# Patient Record
Sex: FEMALE | Race: WHITE | NOT HISPANIC OR LATINO | Employment: OTHER | URBAN - METROPOLITAN AREA
[De-identification: names, ages, dates, MRNs, and addresses within clinical notes are randomized per-mention and may not be internally consistent; named-entity substitution may affect disease eponyms.]

---

## 2022-02-21 ENCOUNTER — TELEPHONE (OUTPATIENT)
Dept: OBSTETRICS AND GYNECOLOGY | Facility: CLINIC | Age: 82
End: 2022-02-21

## 2022-02-21 DIAGNOSIS — Z12.31 SCREENING MAMMOGRAM FOR BREAST CANCER: Primary | ICD-10-CM

## 2022-02-21 NOTE — TELEPHONE ENCOUNTER
----- Message from Cheryl Castro sent at 2/21/2022 10:00 AM CST -----  Regarding: order request  Contact: Pt  Pt is calling to get an order for mammogram. Pt is wanting to get her mammogram in . Please call back at [phone]//thank you acc

## 2022-05-17 ENCOUNTER — TELEPHONE (OUTPATIENT)
Dept: OBSTETRICS AND GYNECOLOGY | Facility: CLINIC | Age: 82
End: 2022-05-17

## 2022-12-13 NOTE — TELEPHONE ENCOUNTER
----- Message from Nora Skip sent at 2022 10:13 AM CST -----  Regarding: New Refill  Contact: patient  Type:  RX Refill Request    Who Called: Rafaela  Refill or New Rx: refill   RX Name and Strength: pantoprazole (PROTONIX) 20 MG tablet  How is the patient currently taking it? (ex. 1XDay):daily   Is this a 30 day or 90 day RX:90  Preferred Pharmacy with phone number:   [facility] [id number] - [place], [place] -  [address]  [place] [address]  Phone: [phone] Fax: [phone]      Local or Mail Order:local   Ordering Provider: Dr Paige  Would the patient rather a call back or a response via [facility]?  Call back   Best Call Back Number: [phone]    Additional Information: The prescription has  and needs a new one      ThanksBRISA

## 2022-12-14 RX ORDER — PANTOPRAZOLE SODIUM 20 MG/1
20 TABLET, DELAYED RELEASE ORAL DAILY
Qty: 90 TABLET | Refills: 1 | Status: SHIPPED | OUTPATIENT
Start: 2022-12-14 | End: 2023-06-19 | Stop reason: SDUPTHER

## 2022-12-14 RX ORDER — PANTOPRAZOLE SODIUM 20 MG/1
20 TABLET, DELAYED RELEASE ORAL DAILY
Qty: 90 TABLET | Refills: 3 | Status: SHIPPED | OUTPATIENT
Start: 2022-12-14 | End: 2022-12-14

## 2022-12-14 RX ORDER — PANTOPRAZOLE SODIUM 20 MG/1
20 TABLET, DELAYED RELEASE ORAL DAILY
Qty: 30 TABLET | Refills: 11 | OUTPATIENT
Start: 2022-12-14 | End: 2023-12-14

## 2023-06-06 ENCOUNTER — TELEPHONE (OUTPATIENT)
Dept: GASTROENTEROLOGY | Facility: CLINIC | Age: 83
End: 2023-06-06
Payer: MEDICARE

## 2023-06-06 NOTE — TELEPHONE ENCOUNTER
----- Message from Anupama Wright sent at 6/6/2023  3:48 PM CDT -----  Patient is calling to change appointment on 7/10/23...Please call her back at [phone]

## 2023-06-14 NOTE — PROGRESS NOTES
Clinic Note    Reason for visit:  The primary encounter diagnosis was Dysphagia, unspecified type. Diagnoses of Esophageal diverticulum, Gastroesophageal reflux disease, unspecified whether esophagitis present, and Family history of colon cancer in mother were also pertinent to this visit.    PCP: Eugene Augustine       HPI:  This is a 83 y.o. female who is established. Last seen 11/2021. She has been having increase in dysphagia with solids since 4/2023. She has to induce vomiting for relief due to dysphagia- mainly with rice, breads, and meats. She has had previous esophageal dilation that helps. She had back surgery 4/2023 she states after intubation is when her dysphagia worsened. On pantoprazole 20mg daily which controls her reflux well. Denies n/v, melena, NSAID use. She has had increase in constipation since her back surgery. Has been taking stool softeners which helps her have daily BM. Deneis rectal bleeding. Her mother had Colon cancer.    2/26/2019 EGD single diverticulum in middle third of esophagus, 14mm Esophageal stricture with dilation, small HH    8/2017 Colonoscopy: normal.     Review of Systems   Constitutional:  Negative for fatigue, fever and unexpected weight change.   HENT:  Positive for trouble swallowing. Negative for mouth sores, postnasal drip and sore throat.    Eyes:  Negative for pain, discharge and eye dryness.   Respiratory:  Negative for apnea, cough, choking, chest tightness, shortness of breath and wheezing.    Cardiovascular:  Negative for chest pain, palpitations and leg swelling.   Gastrointestinal:  Negative for abdominal distention, abdominal pain, anal bleeding, blood in stool, change in bowel habit, constipation, diarrhea, nausea, rectal pain, vomiting, reflux, fecal incontinence and change in bowel habit.   Genitourinary:  Negative for bladder incontinence, dysuria and hematuria.   Musculoskeletal:  Negative for arthralgias, back pain and joint swelling.   Integumentary:   "Negative for color change and rash.   Allergic/Immunologic: Negative for environmental allergies and food allergies.   Neurological:  Negative for seizures and headaches.   Hematological:  Negative for adenopathy. Does not bruise/bleed easily.      Past Medical History:   Diagnosis Date    Essential (primary) hypertension     GERD (gastroesophageal reflux disease)     Mixed hyperlipidemia      Past Surgical History:   Procedure Laterality Date    BACK SURGERY      two rods on either side of spine and 8 screws    COLONOSCOPY       Family History   Problem Relation Age of Onset    Colon cancer Mother      Social History     Tobacco Use    Smoking status: Never    Smokeless tobacco: Never   Substance Use Topics    Alcohol use: Not Currently    Drug use: Never     Review of patient's allergies indicates:   Allergen Reactions    Atorvastatin Other (See Comments)    Simvastatin Other (See Comments)    Allegra-d 12 hour [fexofenadine-pseudoephedrine] Nausea And Vomiting    Azithromycin Rash    Lyrica [pregabalin] Rash      Medication List with Changes/Refills   New Medications    SODIUM,POTASSIUM,MAG SULFATES (SUPREP BOWEL PREP KIT) 17.5-3.13-1.6 GRAM SOLR    Take 177 mLs by mouth once. Take according to kit instructions but DO NOT eat breakfast the morning of procedure. for 1 dose   Current Medications    ASPIRIN (ECOTRIN) 81 MG EC TABLET    Take 81 mg by mouth once daily.    EZETIMIBE (ZETIA) 10 MG TABLET    Take 10 mg by mouth.    GABAPENTIN (NEURONTIN) 300 MG CAPSULE        METOPROLOL SUCCINATE (TOPROL-XL) 50 MG 24 HR TABLET        OLMESARTAN (BENICAR) 20 MG TABLET    Take 20 mg by mouth.    PANTOPRAZOLE (PROTONIX) 20 MG TABLET    Take 1 tablet (20 mg total) by mouth once daily.    TRIAMCINOLONE ACETONIDE 0.1% (KENALOG) 0.1 % CREAM    2 (two) times daily. Apply to affected area         Vital Signs:  BP (!) 166/85   Pulse 80   Ht 5' 2" (1.575 m)   Wt 65.3 kg (144 lb)   SpO2 (!) 94%   BMI 26.34 kg/m²        Physical " Exam  Vitals reviewed.   Constitutional:       General: She is awake. She is not in acute distress.     Appearance: Normal appearance. She is well-developed. She is not ill-appearing, toxic-appearing or diaphoretic.      Comments: Ambulates with cane   HENT:      Head: Normocephalic and atraumatic.      Nose: Nose normal.      Mouth/Throat:      Mouth: Mucous membranes are moist.      Pharynx: Oropharynx is clear. No oropharyngeal exudate or posterior oropharyngeal erythema.   Eyes:      General: Lids are normal. Gaze aligned appropriately. No scleral icterus.        Right eye: No discharge.         Left eye: No discharge.      Conjunctiva/sclera: Conjunctivae normal.   Neck:      Trachea: Trachea normal.   Cardiovascular:      Rate and Rhythm: Normal rate and regular rhythm.      Pulses:           Radial pulses are 2+ on the right side and 2+ on the left side.   Pulmonary:      Effort: Pulmonary effort is normal. No respiratory distress.      Breath sounds: No stridor. No wheezing.   Chest:      Chest wall: No tenderness.   Abdominal:      General: Bowel sounds are normal. There is no distension.      Palpations: Abdomen is soft. There is no fluid wave, hepatomegaly or mass.      Tenderness: There is no abdominal tenderness. There is no guarding or rebound.   Musculoskeletal:         General: No tenderness or deformity.      Cervical back: Full passive range of motion without pain and neck supple. No tenderness.      Right lower leg: No edema.      Left lower leg: No edema.   Lymphadenopathy:      Cervical: No cervical adenopathy.   Skin:     General: Skin is warm and dry.      Capillary Refill: Capillary refill takes less than 2 seconds.      Coloration: Skin is not cyanotic, jaundiced or pale.   Neurological:      General: No focal deficit present.      Mental Status: She is alert and oriented to person, place, and time.      Motor: No tremor.   Psychiatric:         Attention and Perception: Attention normal.          Mood and Affect: Mood and affect normal.         Speech: Speech normal.         Behavior: Behavior normal. Behavior is cooperative.          All of the data above and below has been reviewed by myself and any further interpretations will be reflected in the assessment and plan.   The data includes review of external notes, and independent interpretation of lab results, procedures, x-rays, and imaging reports.      Assessment:  Dysphagia, unspecified type  -     Ambulatory Referral to External Surgery    Esophageal diverticulum  -     Ambulatory Referral to External Surgery    Gastroesophageal reflux disease, unspecified whether esophagitis present    Family history of colon cancer in mother  -     Ambulatory Referral to External Surgery  -     sodium,potassium,mag sulfates (SUPREP BOWEL PREP KIT) 17.5-3.13-1.6 gram SolR; Take 177 mLs by mouth once. Take according to kit instructions but DO NOT eat breakfast the morning of procedure. for 1 dose  Dispense: 1 kit; Refill: 0    Dysphagia- She has history of esophogeal strictures in the past with dilations that has helped her dysphagia. Will plan for EGD with dil. Evaluate for esophageal strictures. Of note she does have hx of esophageal diverticulum.  Gerd- Controlled well with panto 20mg daily  Family history of CRC- mother- last colonoscopy in 2017- will plan for repeat Colonoscopy for screening.     Recommendations:    Schedule for upper and lower endoscopy with Dr. Paige at [facility] with suprep      Risks, benefits, and alternatives of medical management, any associated procedures, and/or treatment discussed with the patient. Patient given opportunity to ask questions and voices understanding. Patient has elected to proceed with the recommended care modalities as discussed.    Follow up in about 1 year (around 6/15/2024).    Order summary:  Orders Placed This Encounter   Procedures    Ambulatory Referral to External Surgery        Instructed patient to notify my  office if they have not been contacted within two weeks after any procedures, submitting any samples (biopsies, blood, stool, urine, etc.) or after any imaging (X-ray, CT, MRI, etc.).      Jennifer Barboza NP    This document may have been created using a voice recognition transcribing system. Incorrect words or phrases may have been missed during proofreading. Please interpret accordingly or contact me for clarification.

## 2023-06-15 ENCOUNTER — OFFICE VISIT (OUTPATIENT)
Dept: GASTROENTEROLOGY | Facility: CLINIC | Age: 83
End: 2023-06-15
Payer: MEDICARE

## 2023-06-15 VITALS
DIASTOLIC BLOOD PRESSURE: 85 MMHG | OXYGEN SATURATION: 94 % | WEIGHT: 144 LBS | SYSTOLIC BLOOD PRESSURE: 166 MMHG | BODY MASS INDEX: 26.5 KG/M2 | HEIGHT: 62 IN | HEART RATE: 80 BPM

## 2023-06-15 DIAGNOSIS — R13.10 DYSPHAGIA, UNSPECIFIED TYPE: Primary | ICD-10-CM

## 2023-06-15 DIAGNOSIS — Z80.0 FAMILY HISTORY OF COLON CANCER IN MOTHER: ICD-10-CM

## 2023-06-15 DIAGNOSIS — Q39.6 ESOPHAGEAL DIVERTICULUM: ICD-10-CM

## 2023-06-15 DIAGNOSIS — K21.9 GASTROESOPHAGEAL REFLUX DISEASE, UNSPECIFIED WHETHER ESOPHAGITIS PRESENT: ICD-10-CM

## 2023-06-15 PROCEDURE — 99214 PR OFFICE/OUTPT VISIT, EST, LEVL IV, 30-39 MIN: ICD-10-PCS | Mod: S$GLB,,, | Performed by: NURSE PRACTITIONER

## 2023-06-15 PROCEDURE — 99214 OFFICE O/P EST MOD 30 MIN: CPT | Mod: S$GLB,,, | Performed by: NURSE PRACTITIONER

## 2023-06-15 RX ORDER — TRIAMCINOLONE ACETONIDE 1 MG/G
CREAM TOPICAL 2 TIMES DAILY
COMMUNITY
Start: 2023-06-05

## 2023-06-15 RX ORDER — OLMESARTAN MEDOXOMIL 20 MG/1
20 TABLET ORAL
COMMUNITY

## 2023-06-15 RX ORDER — GABAPENTIN 300 MG/1
CAPSULE ORAL
COMMUNITY
Start: 2023-05-08

## 2023-06-15 RX ORDER — SODIUM, POTASSIUM,MAG SULFATES 17.5-3.13G
1 SOLUTION, RECONSTITUTED, ORAL ORAL ONCE
Qty: 1 KIT | Refills: 0 | Status: SHIPPED | OUTPATIENT
Start: 2023-06-15 | End: 2023-06-15

## 2023-06-15 RX ORDER — EZETIMIBE 10 MG/1
10 TABLET ORAL
COMMUNITY
Start: 2023-04-20

## 2023-06-15 NOTE — PATIENT INSTRUCTIONS
Schedule for upper and lower endoscopy with Dr. Paige     Please notify my office if you have not been contacted within two weeks after any procedures, submitting any samples (biopsies, blood, stool, urine, etc.) or after any imaging (X-ray, CT, MRI, etc.).

## 2023-06-19 ENCOUNTER — TELEPHONE (OUTPATIENT)
Dept: GASTROENTEROLOGY | Facility: CLINIC | Age: 83
End: 2023-06-19
Payer: MEDICARE

## 2023-06-19 RX ORDER — PANTOPRAZOLE SODIUM 20 MG/1
20 TABLET, DELAYED RELEASE ORAL DAILY
Qty: 90 TABLET | Refills: 1 | Status: SHIPPED | OUTPATIENT
Start: 2023-06-19 | End: 2023-12-07

## 2023-06-19 NOTE — TELEPHONE ENCOUNTER
----- Message from Griselda Ga sent at 6/19/2023  9:25 AM CDT -----  Type:  RX Refill Request    Who Called: Rafaela Mclaughlin  Refill or New Rx: Refill   RX Name and Strength: pantoprazole (PROTONIX) 20 MG tablet  How is the patient currently taking it? (ex. 1XDay):1Xday   Is this a 30 day or 90 day RX: 90  Preferred Pharmacy with phone number:   [id number] - [place], [place] - [address]  [address] [address]  Phone: [phone] Fax: [phone]  Local or Mail Order:Local   Ordering Provider: MEGHA Barboza   Would the patient rather a call back or a response via [facility]?    Best Call Back Number:[phone]    Additional Information: -

## 2023-10-27 ENCOUNTER — TELEPHONE (OUTPATIENT)
Dept: GASTROENTEROLOGY | Facility: CLINIC | Age: 83
End: 2023-10-27
Payer: MEDICARE

## 2023-10-27 VITALS — BODY MASS INDEX: 26.5 KG/M2 | HEIGHT: 62 IN | WEIGHT: 144 LBS

## 2023-10-27 DIAGNOSIS — R13.10 DYSPHAGIA, UNSPECIFIED TYPE: Primary | ICD-10-CM

## 2023-10-27 DIAGNOSIS — Q39.6 ESOPHAGEAL DIVERTICULUM: ICD-10-CM

## 2023-10-27 DIAGNOSIS — Z80.0 FAMILY HISTORY OF COLON CANCER IN MOTHER: ICD-10-CM

## 2023-10-27 NOTE — TELEPHONE ENCOUNTER
"[place] [facility]  [address] [place] [address]  Phone: [phone]  Fax: [phone]    History & Physical         Provider: Dr. Kae Paige    Patient Name: Rafaela CHOE (age):1940  83 y.o.           Gender: female   Phone: [phone]     Referring Physician: Eugene Augustine     Vital Signs:   Height - 5' 2"  Weight - 144 LB  BMI -  26.34    Plan: EGD W/ DIL/COLONOSCOPY @ [facility]    Encounter Diagnoses   Name Primary?    Dysphagia, unspecified type Yes    Family history of colon cancer in mother     Esophageal diverticulum            History:      Past Medical History:   Diagnosis Date    Essential (primary) hypertension     GERD (gastroesophageal reflux disease)     Mixed hyperlipidemia       Past Surgical History:   Procedure Laterality Date    BACK SURGERY      two rods on either side of spine and 8 screws    COLONOSCOPY        Medication List with Changes/Refills   Current Medications    ASPIRIN (ECOTRIN) 81 MG EC TABLET    Take 81 mg by mouth once daily.    EZETIMIBE (ZETIA) 10 MG TABLET    Take 10 mg by mouth.    GABAPENTIN (NEURONTIN) 300 MG CAPSULE        METOPROLOL SUCCINATE (TOPROL-XL) 50 MG 24 HR TABLET        OLMESARTAN (BENICAR) 20 MG TABLET    Take 20 mg by mouth.    PANTOPRAZOLE (PROTONIX) 20 MG TABLET    Take 1 tablet (20 mg total) by mouth once daily.    TRIAMCINOLONE ACETONIDE 0.1% (KENALOG) 0.1 % CREAM    2 (two) times daily. Apply to affected area      Review of patient's allergies indicates:   Allergen Reactions    Atorvastatin Other (See Comments)    Simvastatin Other (See Comments)    Allegra-d 12 hour [fexofenadine-pseudoephedrine] Nausea And Vomiting    Azithromycin Rash    Lyrica [pregabalin] Rash      Family History   Problem Relation Age of Onset    Colon cancer Mother       Social History     Tobacco Use    Smoking status: Never    Smokeless tobacco: Never "   Substance Use Topics    Alcohol use: Not Currently    Drug use: Never        Physical Examination:     General Appearance:___________________________  HEENT: _____________________________________  Abdomen:____________________________________  Heart:________________________________________  Lungs:_______________________________________  Extremities:___________________________________  Skin:_________________________________________  Endocrine:____________________________________  Genitourinary:_________________________________  Neurological:__________________________________      Patient has been evaluated immediately prior to sedation and is medically cleared for endoscopy with IVCS as an ASA class: ______      Physician Signature: _________________________       Date: ________  Time: ________

## 2023-11-01 ENCOUNTER — OUTSIDE PLACE OF SERVICE (OUTPATIENT)
Dept: GASTROENTEROLOGY | Facility: CLINIC | Age: 83
End: 2023-11-01

## 2023-11-01 ENCOUNTER — OUTSIDE PLACE OF SERVICE (OUTPATIENT)
Dept: GASTROENTEROLOGY | Facility: CLINIC | Age: 83
End: 2023-11-01
Payer: MEDICARE

## 2023-11-01 LAB — CRC RECOMMENDATION EXT: NORMAL

## 2023-11-01 PROCEDURE — 43249 ESOPH EGD DILATION <30 MM: CPT | Mod: 51,,, | Performed by: INTERNAL MEDICINE

## 2023-11-01 PROCEDURE — 45380 COLONOSCOPY AND BIOPSY: CPT | Mod: PT,,, | Performed by: INTERNAL MEDICINE

## 2023-11-01 PROCEDURE — 43239 PR EGD, FLEX, W/BIOPSY, SGL/MULTI: ICD-10-PCS | Mod: 59,,, | Performed by: INTERNAL MEDICINE

## 2023-11-01 PROCEDURE — 43239 EGD BIOPSY SINGLE/MULTIPLE: CPT | Mod: 59,,, | Performed by: INTERNAL MEDICINE

## 2023-11-01 PROCEDURE — 43249 PR EGD, FLEX, W/BALL DILATION, < 30MM: ICD-10-PCS | Mod: 51,,, | Performed by: INTERNAL MEDICINE

## 2023-11-01 PROCEDURE — 45380 PR COLONOSCOPY,BIOPSY: ICD-10-PCS | Mod: PT,,, | Performed by: INTERNAL MEDICINE

## 2023-11-12 ENCOUNTER — TELEPHONE (OUTPATIENT)
Dept: GASTROENTEROLOGY | Facility: CLINIC | Age: 83
End: 2023-11-12
Payer: MEDICARE

## 2023-11-12 DIAGNOSIS — Q39.6 ESOPHAGEAL DIVERTICULUM: ICD-10-CM

## 2023-11-12 DIAGNOSIS — R13.10 DYSPHAGIA, UNSPECIFIED TYPE: Primary | ICD-10-CM

## 2023-11-12 NOTE — TELEPHONE ENCOUNTER
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 Esophageal dilation to 15mm.    Notify patient. No infection or precancerous cells on her stomach and esophageal Bx. How are her swallowing Sx after the dilation?  Her colon polyp was benign. Repeat colonoscopy in two years. Confirm recall tab in appointment desk is up-to-date (update if needed). Rec UGIS to better evaluate her esophageal diverticula. Order signed. She is to notify me if any issues. Not ordering EM given her esophageal diverticula and risk of perforation with EM catheter placement.  NBP

## 2023-11-15 ENCOUNTER — TELEPHONE (OUTPATIENT)
Dept: GASTROENTEROLOGY | Facility: CLINIC | Age: 83
End: 2023-11-15
Payer: MEDICARE

## 2023-11-15 NOTE — TELEPHONE ENCOUNTER
----- Message from Nora Valdivia sent at 11/15/2023  4:12 PM CST -----  Regarding: Results  Contact: patient  Type:  Patient Returning Call    Who Called:Rafaela   Who Left Message for Patient: nurse   Does the patient know what this is regarding?:colonoscopy   Would the patient rather a call back or a response via [facility]?  Call back   Best Call Back Number: [phone] (home)     Additional Information:     BRISA Marquis

## 2023-11-15 NOTE — TELEPHONE ENCOUNTER
Staff called pt..Danyell HOLGUIN NP spoke to pt ref to results... Upper GI order faxed to central scheduling

## 2023-12-07 RX ORDER — PANTOPRAZOLE SODIUM 20 MG/1
TABLET, DELAYED RELEASE ORAL
Qty: 90 TABLET | Refills: 3 | Status: SHIPPED | OUTPATIENT
Start: 2023-12-07

## 2023-12-11 ENCOUNTER — DOCUMENTATION ONLY (OUTPATIENT)
Dept: GASTROENTEROLOGY | Facility: CLINIC | Age: 83
End: 2023-12-11

## 2024-06-18 ENCOUNTER — OFFICE VISIT (OUTPATIENT)
Dept: GASTROENTEROLOGY | Facility: CLINIC | Age: 84
End: 2024-06-18
Payer: MEDICARE

## 2024-06-18 VITALS
HEART RATE: 70 BPM | BODY MASS INDEX: 27.75 KG/M2 | HEIGHT: 62 IN | DIASTOLIC BLOOD PRESSURE: 77 MMHG | WEIGHT: 150.81 LBS | OXYGEN SATURATION: 95 % | SYSTOLIC BLOOD PRESSURE: 162 MMHG | RESPIRATION RATE: 16 BRPM

## 2024-06-18 DIAGNOSIS — K21.9 GASTROESOPHAGEAL REFLUX DISEASE, UNSPECIFIED WHETHER ESOPHAGITIS PRESENT: ICD-10-CM

## 2024-06-18 DIAGNOSIS — R13.10 DYSPHAGIA, UNSPECIFIED TYPE: Primary | ICD-10-CM

## 2024-06-18 DIAGNOSIS — Z80.0 FAMILY HISTORY OF COLON CANCER IN MOTHER: ICD-10-CM

## 2024-06-18 DIAGNOSIS — Q39.6 ESOPHAGEAL DIVERTICULUM: ICD-10-CM

## 2024-06-18 DIAGNOSIS — Z86.010 HISTORY OF COLON POLYPS: ICD-10-CM

## 2024-06-18 PROCEDURE — 99214 OFFICE O/P EST MOD 30 MIN: CPT | Mod: S$GLB,,, | Performed by: INTERNAL MEDICINE

## 2024-06-18 RX ORDER — OXYBUTYNIN CHLORIDE 10 MG/1
10 TABLET, EXTENDED RELEASE ORAL DAILY
COMMUNITY
Start: 2024-04-11

## 2024-06-18 RX ORDER — NITROFURANTOIN MACROCRYSTALS 50 MG/1
50 CAPSULE ORAL DAILY
COMMUNITY
Start: 2024-04-11

## 2024-06-18 RX ORDER — LATANOPROST 50 UG/ML
1 SOLUTION/ DROPS OPHTHALMIC NIGHTLY
COMMUNITY
Start: 2024-05-28

## 2024-06-18 RX ORDER — OLMESARTAN MEDOXOMIL AND HYDROCHLOROTHIAZIDE 20/12.5 20; 12.5 MG/1; MG/1
1 TABLET ORAL DAILY
COMMUNITY
Start: 2024-04-15

## 2024-06-18 NOTE — LETTER
June 18, 2024        Eugene Augustine MD  [address] [facility] [address]             [place] [facility]  [address] [address]  Phone: [phone]  Fax: [phone]   Patient: Rafaela Mclaughlin   MR Number: [id number]   YOB: 1940   Date of Visit: 6/18/2024       Dear Dr. Augustine:    Thank you for referring Rafaela Mclaughlin to me for evaluation. Attached you will find relevant portions of my assessment and plan of care.    If you have questions, please do not hesitate to call me. I look forward to following Rafaela Mclaughlin along with you.    Sincerely,      Kae Paige MD            CC  No Recipients    Enclosure

## 2024-06-18 NOTE — PROGRESS NOTES
Clinic Note    Reason for visit:  The primary encounter diagnosis was Dysphagia, unspecified type. Diagnoses of Esophageal diverticulum, Gastroesophageal reflux disease, unspecified whether esophagitis present, Family history of colon cancer in mother, and History of colon polyps were also pertinent to this visit.    PCP: Eugene Augustine       HPI:  This is a 84 y.o. female here for follow up. Patient with dysphagia. She felt mild improvement after esophageal dilation. UGIS ordered due to esophageal diverticulum. She waiting 2.5hrs for procedure so cancelled. She states she has episodes of dysphagia about once every 3 weeks. Usually with meats, rice, read. Chews well and her symptoms are tolerable. Takes pantoprazole 20mg daily which controls reflux. BM are regular.      EGD/Colonoscopy 11/1/2023 Esophageal diverticulum, GBx mild chr inact w/o Hp, mid-EBx nl, 1 TA, fecal contamination repeat colonoscopy in 2 years. Esophageal dilation to 15mm.     Review of Systems   Constitutional:  Negative for fatigue, fever and unexpected weight change.   HENT:  Negative for mouth sores, postnasal drip, sore throat and trouble swallowing.    Eyes:  Negative for pain, discharge and eye dryness.   Respiratory:  Negative for apnea, cough, choking, chest tightness, shortness of breath and wheezing.    Cardiovascular:  Negative for chest pain, palpitations and leg swelling.   Gastrointestinal:  Negative for abdominal distention, abdominal pain, anal bleeding, blood in stool, change in bowel habit, constipation, diarrhea, nausea, rectal pain, vomiting, reflux and fecal incontinence.   Genitourinary:  Negative for bladder incontinence, dysuria and hematuria.   Musculoskeletal:  Negative for arthralgias, back pain and joint swelling.   Integumentary:  Negative for color change and rash.   Allergic/Immunologic: Negative for environmental allergies and food allergies.   Neurological:  Negative for seizures and headaches.    Hematological:  Negative for adenopathy. Does not bruise/bleed easily.        Past Medical History:   Diagnosis Date    Essential (primary) hypertension     GERD (gastroesophageal reflux disease)     Mixed hyperlipidemia      Past Surgical History:   Procedure Laterality Date    BACK SURGERY      two rods on either side of spine and 8 screws    COLONOSCOPY       Family History   Problem Relation Name Age of Onset    Colon cancer Mother       Social History     Tobacco Use    Smoking status: Never    Smokeless tobacco: Never   Substance Use Topics    Alcohol use: Not Currently    Drug use: Never     Review of patient's allergies indicates:   Allergen Reactions    Atorvastatin Other (See Comments)    Simvastatin Other (See Comments)    Allegra-d 12 hour [fexofenadine-pseudoephedrine] Nausea And Vomiting    Azithromycin Rash    Lyrica [pregabalin] Rash        Medication List with Changes/Refills   Current Medications    ASPIRIN (ECOTRIN) 81 MG EC TABLET    Take 81 mg by mouth once daily.    EZETIMIBE (ZETIA) 10 MG TABLET    Take 10 mg by mouth.    LATANOPROST 0.005 % OPHTHALMIC SOLUTION    Place 1 drop into the left eye every evening.    METOPROLOL SUCCINATE (TOPROL-XL) 50 MG 24 HR TABLET        NITROFURANTOIN (MACRODANTIN) 50 MG CAPSULE    Take 50 mg by mouth once daily.    OLMESARTAN-HYDROCHLOROTHIAZIDE (BENICAR HCT) 20-12.5 MG PER TABLET    Take 1 tablet by mouth once daily.    OXYBUTYNIN (DITROPAN-XL) 10 MG 24 HR TABLET    Take 10 mg by mouth once daily.    PANTOPRAZOLE (PROTONIX) 20 MG TABLET    TAKE ONE TABLET BY MOUTH TAKE 1 TABLET BY MOUTH ONCE DAILY    TRIAMCINOLONE ACETONIDE 0.1% (KENALOG) 0.1 % CREAM    2 (two) times daily. Apply to affected area   Discontinued Medications    GABAPENTIN (NEURONTIN) 300 MG CAPSULE        OLMESARTAN (BENICAR) 20 MG TABLET    Take 20 mg by mouth.         Vital Signs:  BP (!) 162/77 (BP Location: Left arm, Patient Position: Sitting, BP Method: Medium (Automatic))   Pulse 70   " Resp 16   Ht 5' 2" (1.575 m)   Wt 68.4 kg (150 lb 12.8 oz)   SpO2 95%   BMI 27.58 kg/m²         Physical Exam  Vitals reviewed.   Constitutional:       General: She is awake. She is not in acute distress.     Appearance: Normal appearance. She is well-developed. She is not ill-appearing, toxic-appearing or diaphoretic.   HENT:      Head: Normocephalic and atraumatic.      Nose: Nose normal.      Mouth/Throat:      Mouth: Mucous membranes are moist.      Pharynx: Oropharynx is clear. No oropharyngeal exudate or posterior oropharyngeal erythema.   Eyes:      General: Lids are normal. Gaze aligned appropriately. No scleral icterus.        Right eye: No discharge.         Left eye: No discharge.      Conjunctiva/sclera: Conjunctivae normal.   Neck:      Trachea: Trachea normal.   Cardiovascular:      Rate and Rhythm: Normal rate and regular rhythm.      Pulses:           Radial pulses are 2+ on the right side and 2+ on the left side.   Pulmonary:      Effort: Pulmonary effort is normal. No respiratory distress.      Breath sounds: No stridor. No wheezing.   Chest:      Chest wall: No tenderness.   Abdominal:      General: Bowel sounds are normal. There is no distension.      Palpations: Abdomen is soft. There is no fluid wave, hepatomegaly or mass.      Tenderness: There is no abdominal tenderness. There is no guarding or rebound.   Musculoskeletal:         General: No tenderness or deformity.      Cervical back: Full passive range of motion without pain and neck supple. No tenderness.      Right lower leg: No edema.      Left lower leg: No edema.   Lymphadenopathy:      Cervical: No cervical adenopathy.   Skin:     General: Skin is warm and dry.      Capillary Refill: Capillary refill takes less than 2 seconds.      Coloration: Skin is not cyanotic, jaundiced or pale.   Neurological:      General: No focal deficit present.      Mental Status: She is alert and oriented to person, place, and time.      Motor: No " tremor.   Psychiatric:         Attention and Perception: Attention normal.         Mood and Affect: Mood and affect normal.         Speech: Speech normal.         Behavior: Behavior normal. Behavior is cooperative.            All of the data above and below has been reviewed by myself and any further interpretations will be reflected in the assessment and plan.   The data includes review of external notes, and independent interpretation of lab results, procedures, x-rays, and imaging reports.      Assessment:  Dysphagia, unspecified type    Esophageal diverticulum    Gastroesophageal reflux disease, unspecified whether esophagitis present    Family history of colon cancer in mother    History of colon polyps       GERD- on pantoprazole 20mg daily  Dysphagia- w/ solids. UGIS to evaluate esophageal diverticulum. She is feeling ok and will notify us if symptoms worsen.  Hx colon polyps- colonoscopy due 11/2025 with aggressive prep    Recommendations:    Notify if symptoms worsen.      Risks, benefits, and alternatives of medical management, any associated procedures, and/or treatment discussed with the patient. Patient given opportunity to ask questions and voices understanding. Patient has elected to proceed with the recommended care modalities as discussed.    Instructed patient to notify my office if they have not been contacted within two weeks after any procedures, submitting any samples (biopsies, blood, stool, urine, etc.) or after any imaging (X-ray, CT, MRI, etc.).     Follow up in about 1 year (around 6/18/2025).    Order summary:  No orders of the defined types were placed in this encounter.     This assessment, plan, and documentation was performed in collaboration with Jennifer Barboza NP.     This document may have been created using a voice recognition transcribing system. Incorrect words or phrases may have been missed during proofreading. Please interpret accordingly or contact me for clarification.      Kae Paige MD

## 2024-06-18 NOTE — PATIENT INSTRUCTIONS
Notify if symptoms worsen.    Please notify my office if you have not been contacted within two weeks after any procedures, submitting any samples (biopsies, blood, stool, urine, etc.) or after any imaging (X-ray, CT, MRI, etc.).

## 2024-11-01 ENCOUNTER — TELEPHONE (OUTPATIENT)
Dept: GASTROENTEROLOGY | Facility: CLINIC | Age: 84
End: 2024-11-01
Payer: MEDICARE

## 2024-11-15 ENCOUNTER — TELEPHONE (OUTPATIENT)
Dept: OBSTETRICS AND GYNECOLOGY | Facility: CLINIC | Age: 84
End: 2024-11-15
Payer: MEDICARE

## 2024-11-15 DIAGNOSIS — Z12.31 SCREENING MAMMOGRAM FOR BREAST CANCER: Primary | ICD-10-CM

## 2024-11-15 NOTE — TELEPHONE ENCOUNTER
Spoke with pt. She usually gets her mammograms done at [facility], but she is already over a year because she was sick and missed her original appointment and now they can't get her in for another six months. She would like to have her mammogram done with [facility]. I faxed her order and gave her the phone # to call and schedule.

## 2024-11-15 NOTE — TELEPHONE ENCOUNTER
----- Message from Anupama sent at 11/14/2024  2:04 PM CST -----  Type: Orders Request    What orders/ testing are being requested? Mammogram     Is there a future appointment scheduled for the patient with PCP? No     When? no    Would you prefer a response via [employer]? No     Comments:

## 2024-12-03 RX ORDER — PANTOPRAZOLE SODIUM 20 MG/1
20 TABLET, DELAYED RELEASE ORAL
Qty: 90 TABLET | Refills: 3 | Status: SHIPPED | OUTPATIENT
Start: 2024-12-03

## 2025-06-25 ENCOUNTER — OFFICE VISIT (OUTPATIENT)
Dept: UROLOGY | Facility: CLINIC | Age: 85
End: 2025-06-25
Payer: MEDICARE

## 2025-06-25 VITALS
DIASTOLIC BLOOD PRESSURE: 77 MMHG | BODY MASS INDEX: 24.73 KG/M2 | HEART RATE: 70 BPM | OXYGEN SATURATION: 98 % | SYSTOLIC BLOOD PRESSURE: 132 MMHG | WEIGHT: 134.38 LBS | HEIGHT: 62 IN | RESPIRATION RATE: 20 BRPM | TEMPERATURE: 98 F

## 2025-06-25 DIAGNOSIS — N30.90 RECURRENT CYSTITIS: ICD-10-CM

## 2025-06-25 DIAGNOSIS — R39.15 URINARY URGENCY: Primary | ICD-10-CM

## 2025-06-25 LAB
BILIRUB SERPL-MCNC: NORMAL MG/DL
BLOOD URINE, POC: NORMAL
COLOR, POC UA: YELLOW
GLUCOSE UR QL STRIP: NORMAL
KETONES UR QL STRIP: NORMAL
LEUKOCYTE ESTERASE URINE, POC: NORMAL
NITRITE, POC UA: NORMAL
PH, POC UA: 7
POC RESIDUAL URINE VOLUME: 14 ML (ref 0–100)
PROTEIN, POC: NORMAL
SPECIFIC GRAVITY, POC UA: 1.02
UROBILINOGEN, POC UA: 0.2

## 2025-06-25 PROCEDURE — 99215 OFFICE O/P EST HI 40 MIN: CPT | Mod: PBBFAC | Performed by: UROLOGY

## 2025-06-25 PROCEDURE — 51798 US URINE CAPACITY MEASURE: CPT | Mod: PBBFAC | Performed by: UROLOGY

## 2025-06-25 PROCEDURE — 81001 URINALYSIS AUTO W/SCOPE: CPT | Mod: PBBFAC | Performed by: UROLOGY

## 2025-06-25 PROCEDURE — 99203 OFFICE O/P NEW LOW 30 MIN: CPT | Mod: S$PBB,,, | Performed by: UROLOGY

## 2025-06-25 RX ORDER — MIRABEGRON 25 MG/1
25 TABLET, FILM COATED, EXTENDED RELEASE ORAL DAILY
Qty: 30 TABLET | Refills: 11 | Status: SHIPPED | OUTPATIENT
Start: 2025-06-25 | End: 2026-06-25

## 2025-06-25 NOTE — PROGRESS NOTES
Pt seen by Dr. Montenegro; Bladder scan performed w/14 mls recorded; Pt instructed to return to clinic in 1 year; Discharge paperwork given w/pt verbalizing understanding

## 2025-06-25 NOTE — PROGRESS NOTES
CC:  Yearly    HPI:  Rafaela Mclaughlin is a 85 y.o. female seen for yearly follow-up.  She has a history of recurrent urinary tract infections.  She has been on macrodantin 50 mg prophylaxis in the past but had to stop because the price went up.  She recently went to Urgent Care for a urinary tract infection which was treated with Keflex 500 mg four times a day.  Those symptoms resolved after treatment.    She also has urgency.  She has been on Oxybutynin but had to stop it because of severe dry mouth.  Myrbetriq has been too expensive in the past.      Urinalysis:  Results for orders placed or performed in visit on 06/25/25   POCT URINE DIPSTICK WITH MICROSCOPE, AUTOMATED   Result Value Ref Range    Color, UA Yellow     Spec Grav UA 1.020     pH, UA 7.0     WBC, UA neg     Nitrite, UA neg     Protein, POC neg     Glucose, UA neg     Ketones, UA neg     Urobilinogen, UA 0.2     Bilirubin, POC neg     Blood, UA neg      Microscopic Urinalysis:  WBC:   None per HPF     RBC:    None per HPF    Bacteria:    None per HPF     Squamous epithelial cells:  None per HPF       Crystals:   None     ROS:  All systems reviewed and are negative except as documented in HPI and/or Assessment and Plan.     Patient Active Problem List:     Problem List[1]     Past Medical History:  Past Medical History:   Diagnosis Date    Essential (primary) hypertension     GERD (gastroesophageal reflux disease)     Mixed hyperlipidemia         Past Surgical History:  Past Surgical History:   Procedure Laterality Date    BACK SURGERY      two rods on either side of spine and 8 screws    COLONOSCOPY          Family History:  Family History   Problem Relation Name Age of Onset    Colon cancer Mother          Social History:  Social History     Socioeconomic History    Marital status:    Tobacco Use    Smoking status: Never    Smokeless tobacco: Never   Substance and Sexual Activity    Alcohol use: Not Currently    Drug use: Never   Social History  Narrative    ** Merged History Encounter **             Allergies:  Review of patient's allergies indicates:   Allergen Reactions    Atorvastatin Other (See Comments)    Simvastatin Other (See Comments)    Allegra-d 12 hour [fexofenadine-pseudoephedrine] Nausea And Vomiting    Azithromycin Rash    Lyrica [pregabalin] Rash        Objective:  Vitals:    06/25/25 0956   BP: 132/77   Pulse: 70   Resp: 20   Temp: 98 °F (36.7 °C)     General:  Well developed, well nourished adult female in no acute distress  Abdomen: Soft, nontender, no masses  Extremities:  No clubbing, cyanosis, or edema  Neurologic:  Grossly intact  Musculoskeletal:  Normal tone    Assessment:  1. Urinary urgency  - POCT URINE DIPSTICK WITH MICROSCOPE, AUTOMATED  - POCT Bladder Scan  - mirabegron (MYRBETRIQ) 25 mg Tb24 ER tablet; Take 1 tablet (25 mg total) by mouth once daily.  Dispense: 30 tablet; Refill: 11    2. Recurrent cystitis     Plan:  Myrbetriq was again sent to the pharmacy to see if her coverage has changed.  If not I did tell her about the cost of getting that medication here.  We could try something other than oxybutynin if she can not get Myrbetriq.  Continue off prophylaxis for now.  May need to revisit this in the future.    Follow-up tests needed:   None     Return appointment:  One year.                         [1] There is no problem list on file for this patient.